# Patient Record
Sex: MALE | Race: WHITE | NOT HISPANIC OR LATINO | Employment: OTHER | URBAN - METROPOLITAN AREA
[De-identification: names, ages, dates, MRNs, and addresses within clinical notes are randomized per-mention and may not be internally consistent; named-entity substitution may affect disease eponyms.]

---

## 2023-10-19 ENCOUNTER — OUTSIDE FACILITY SERVICE (OUTPATIENT)
Dept: CARDIOLOGY | Facility: CLINIC | Age: 48
End: 2023-10-19
Payer: MEDICAID

## 2025-07-10 ENCOUNTER — LAB (OUTPATIENT)
Dept: LAB | Facility: HOSPITAL | Age: 50
End: 2025-07-10
Payer: MEDICAID

## 2025-07-10 ENCOUNTER — OFFICE VISIT (OUTPATIENT)
Dept: GASTROENTEROLOGY | Facility: CLINIC | Age: 50
End: 2025-07-10
Payer: MEDICAID

## 2025-07-10 VITALS
OXYGEN SATURATION: 99 % | WEIGHT: 260 LBS | HEART RATE: 77 BPM | DIASTOLIC BLOOD PRESSURE: 82 MMHG | BODY MASS INDEX: 34.46 KG/M2 | SYSTOLIC BLOOD PRESSURE: 126 MMHG | HEIGHT: 73 IN

## 2025-07-10 DIAGNOSIS — K74.60 CIRRHOSIS OF LIVER WITHOUT ASCITES, UNSPECIFIED HEPATIC CIRRHOSIS TYPE: Primary | Chronic | ICD-10-CM

## 2025-07-10 DIAGNOSIS — E66.09 CLASS 1 OBESITY DUE TO EXCESS CALORIES WITH SERIOUS COMORBIDITY AND BODY MASS INDEX (BMI) OF 34.0 TO 34.9 IN ADULT: Chronic | ICD-10-CM

## 2025-07-10 DIAGNOSIS — B19.10 HEPATITIS B INFECTION WITHOUT DELTA AGENT WITHOUT HEPATIC COMA, UNSPECIFIED CHRONICITY: ICD-10-CM

## 2025-07-10 DIAGNOSIS — E66.811 CLASS 1 OBESITY DUE TO EXCESS CALORIES WITH SERIOUS COMORBIDITY AND BODY MASS INDEX (BMI) OF 34.0 TO 34.9 IN ADULT: Chronic | ICD-10-CM

## 2025-07-10 DIAGNOSIS — Z86.0100 PERSONAL HISTORY OF COLON POLYPS, UNSPECIFIED: Chronic | ICD-10-CM

## 2025-07-10 DIAGNOSIS — R79.0 ABNORMAL SERUM IRON LEVEL: Chronic | ICD-10-CM

## 2025-07-10 DIAGNOSIS — K74.60 CIRRHOSIS OF LIVER WITHOUT ASCITES, UNSPECIFIED HEPATIC CIRRHOSIS TYPE: ICD-10-CM

## 2025-07-10 DIAGNOSIS — R05.9 COUGH, UNSPECIFIED TYPE: Chronic | ICD-10-CM

## 2025-07-10 DIAGNOSIS — B19.10 HEPATITIS B INFECTION WITHOUT DELTA AGENT WITHOUT HEPATIC COMA, UNSPECIFIED CHRONICITY: Chronic | ICD-10-CM

## 2025-07-10 DIAGNOSIS — D69.6 THROMBOCYTOPENIA: ICD-10-CM

## 2025-07-10 PROBLEM — M25.569 GONALGIA: Status: ACTIVE | Noted: 2025-07-10

## 2025-07-10 PROBLEM — R76.8 HEPATITIS B CORE ANTIBODY POSITIVE: Status: ACTIVE | Noted: 2025-07-10

## 2025-07-10 PROBLEM — R13.19 ESOPHAGEAL DYSPHAGIA: Chronic | Status: ACTIVE | Noted: 2025-07-10

## 2025-07-10 PROBLEM — E78.5 HLD (HYPERLIPIDEMIA): Status: ACTIVE | Noted: 2025-07-10

## 2025-07-10 PROBLEM — M06.9 ARTHRITIS OR POLYARTHRITIS, RHEUMATOID: Status: ACTIVE | Noted: 2025-07-10

## 2025-07-10 LAB
DEPRECATED RDW RBC AUTO: 48.3 FL (ref 37–54)
ERYTHROCYTE [DISTWIDTH] IN BLOOD BY AUTOMATED COUNT: 13 % (ref 12.3–15.4)
HCT VFR BLD AUTO: 47.1 % (ref 37.5–51)
HGB BLD-MCNC: 16.2 G/DL (ref 13–17.7)
INR PPP: 1.04 (ref 0.9–1.1)
MCH RBC QN AUTO: 34.7 PG (ref 26.6–33)
MCHC RBC AUTO-ENTMCNC: 34.4 G/DL (ref 31.5–35.7)
MCV RBC AUTO: 100.9 FL (ref 79–97)
PLATELET # BLD AUTO: 104 10*3/MM3 (ref 140–450)
PMV BLD AUTO: 11.9 FL (ref 6–12)
PROTHROMBIN TIME: 14.4 SECONDS (ref 12.3–15.1)
RBC # BLD AUTO: 4.67 10*6/MM3 (ref 4.14–5.8)
WBC NRBC COR # BLD AUTO: 7.39 10*3/MM3 (ref 3.4–10.8)

## 2025-07-10 PROCEDURE — 1159F MED LIST DOCD IN RCRD: CPT | Performed by: NURSE PRACTITIONER

## 2025-07-10 PROCEDURE — 83540 ASSAY OF IRON: CPT

## 2025-07-10 PROCEDURE — 84466 ASSAY OF TRANSFERRIN: CPT

## 2025-07-10 PROCEDURE — 87341 HEP B SURFACE AG NEUTRLZJ IA: CPT

## 2025-07-10 PROCEDURE — 1160F RVW MEDS BY RX/DR IN RCRD: CPT | Performed by: NURSE PRACTITIONER

## 2025-07-10 PROCEDURE — 86707 HEPATITIS BE ANTIBODY: CPT

## 2025-07-10 PROCEDURE — 87340 HEPATITIS B SURFACE AG IA: CPT

## 2025-07-10 PROCEDURE — 99204 OFFICE O/P NEW MOD 45 MIN: CPT | Performed by: NURSE PRACTITIONER

## 2025-07-10 PROCEDURE — 82728 ASSAY OF FERRITIN: CPT

## 2025-07-10 PROCEDURE — 86708 HEPATITIS A ANTIBODY: CPT

## 2025-07-10 PROCEDURE — 80053 COMPREHEN METABOLIC PANEL: CPT

## 2025-07-10 PROCEDURE — 85027 COMPLETE CBC AUTOMATED: CPT

## 2025-07-10 PROCEDURE — 87517 HEPATITIS B DNA QUANT: CPT

## 2025-07-10 PROCEDURE — 85610 PROTHROMBIN TIME: CPT

## 2025-07-10 PROCEDURE — 87350 HEPATITIS BE AG IA: CPT

## 2025-07-10 PROCEDURE — 36415 COLL VENOUS BLD VENIPUNCTURE: CPT

## 2025-07-10 RX ORDER — MONTELUKAST SODIUM 10 MG/1
TABLET ORAL
COMMUNITY
Start: 2025-06-09

## 2025-07-10 RX ORDER — SODIUM CHLORIDE 9 MG/ML
70 INJECTION, SOLUTION INTRAVENOUS CONTINUOUS PRN
OUTPATIENT
Start: 2025-07-10 | End: 2025-07-11

## 2025-07-10 RX ORDER — SPIRONOLACTONE 100 MG/1
100 TABLET, FILM COATED ORAL DAILY
COMMUNITY

## 2025-07-10 RX ORDER — FUROSEMIDE 40 MG/1
40 TABLET ORAL 2 TIMES DAILY
COMMUNITY

## 2025-07-10 RX ORDER — FLUTICASONE PROPIONATE AND SALMETEROL 50; 250 UG/1; UG/1
POWDER RESPIRATORY (INHALATION)
COMMUNITY
Start: 2025-06-09

## 2025-07-10 NOTE — PATIENT INSTRUCTIONS
Low sodium diet - avoid salt, salt substitutes, ketchup, soy sauce, pickles, crackers, chips, canned soups, etc.  Hepatitis B counseling.   Labs  Abdominal ultrasound  Colonoscopy for surveillance in July 2028.   Upper endoscopy-EGD: The indications, technique, alternatives and potential risk and complications were discussed with the patient including but not limited to bleeding, perforations, missing lesions and anesthetic complications. The patient understands and wishes to proceed with the procedure and has given their verbal consent. Written patient education information was given to the patient.   The patient will call if they have further questions before procedure.

## 2025-07-10 NOTE — PROGRESS NOTES
New Patient Consult      Date: 07/10/2025   Patient Name: Silvio Mcdonough  MRN: [id number]  : 1975     Primary Care Provider: Esperanza Fiore APRN    Chief Complaint   Patient presents with    Hepatitis     7/10/2025  History of Present Illness  The patient is a 50-year-old male who is here for evaluation of hepatitis B.    He was diagnosed with cirrhosis of the liver approximately 2 years ago, which was attributed to his hepatitis B infection. A CT scan conducted in  revealed signs of cirrhosis and portal hypertension.  He reports no history of intravenous drug use or alcohol consumption. He has tattoos and recalls possibly receiving a blood transfusion during a leg operation. He smokes cigarettes but does not use smokeless tobacco or vape. He reports no family history of liver disease. He is currently taking diuretics -Lasix 40 mg twice a day and spironolactone 100 mg once a day that was prescribed by Dr. Garcia. He is wanting to transfer to this practice as he felt he was not given any information regarding his diagnosis and was not receiving appropriate treatment. He underwent an endoscopy and colonoscopy by Dr. Garcia in .    He experiences mild pain on right side of his back but reports no abdominal pain, nausea, vomiting, constipation, diarrhea, or presence of bloody or black tarry stools. He does report coughing fits after eating, as if he is choking, but does not cough up food.  He does not have trouble swallowing food while eating.  He does not experience reflux, indigestion, or heartburn.    SOCIAL HISTORY  Alcohol: The patient reports no alcohol use.  Tobacco: The patient smokes cigarettes.  Recreational Drugs: The patient reports no history of i.v. drug use.    FAMILY HISTORY  - Mother: Breast cancer  - Negative for liver disease, colon cancer, stomach cancer, esophageal cancers     Subjective      Past Medical History:   Diagnosis Date    COPD (chronic obstructive  pulmonary disease)     Hepatitis B 2023     Past Surgical History:   Procedure Laterality Date    KNEE SURGERY Left 2006     Family History   Problem Relation Age of Onset    Breast cancer Mother     Liver disease Neg Hx     Colon cancer Neg Hx     Esophageal cancer Neg Hx     Stomach cancer Neg Hx      Social History     Socioeconomic History    Marital status:    Tobacco Use    Smoking status: Every Day     Types: Cigarettes    Smokeless tobacco: Never   Vaping Use    Vaping status: Never Used   Substance and Sexual Activity    Alcohol use: Never    Drug use: Never    Sexual activity: Defer       Current Outpatient Medications:     Advair Diskus 250-50 MCG/ACT DISKUS, INHALE ONE (1) PUFF BY MOUTH TWICE DAILY IN THE MORNING AND EVENING APPROXIMATELY 12 HOURS APART, Disp: , Rfl:     furosemide (LASIX) 40 MG tablet, Take 1 tablet by mouth 2 (Two) Times a Day., Disp: , Rfl:     montelukast (SINGULAIR) 10 MG tablet, take one (1) tablet by mouth each evening, Disp: , Rfl:     spironolactone (ALDACTONE) 100 MG tablet, Take 1 tablet by mouth Daily., Disp: , Rfl:      No Known Allergies    The following portions of the patient's history were reviewed and updated as appropriate: allergies, current medications, past family history, past medical history, past social history, past surgical history and problem list.    Objective     Physical Exam  Vitals and nursing note reviewed.   Constitutional:       General: He is not in acute distress.     Appearance: Normal appearance. He is well-developed.   HENT:      Head: Normocephalic and atraumatic.      Mouth/Throat:      Mouth: Mucous membranes are not pale, not dry and not cyanotic.   Eyes:      General: Lids are normal.   Neck:      Trachea: Trachea normal.   Cardiovascular:      Rate and Rhythm: Normal rate.   Pulmonary:      Effort: Pulmonary effort is normal. No respiratory distress.      Breath sounds: Normal breath sounds.   Abdominal:      General: Bowel sounds are  "normal.      Palpations: Abdomen is soft.      Tenderness: There is no abdominal tenderness.   Skin:     General: Skin is warm and dry.   Neurological:      Mental Status: He is alert and oriented to person, place, and time.   Psychiatric:         Mood and Affect: Mood normal.         Speech: Speech normal.         Behavior: Behavior normal. Behavior is cooperative.       Vitals:    07/10/25 1345   BP: 126/82   Pulse: 77   SpO2: 99%   Weight: 118 kg (260 lb)   Height: 185.4 cm (73\")     Body mass index is 34.3 kg/m².     Results Review:   I have reviewed the patient's new clinical and imaging results.    Lab on 07/10/2025   Component Date Value Ref Range Status    Protime 07/10/2025 14.4  12.3 - 15.1 Seconds Final    INR 07/10/2025 1.04  0.90 - 1.10 Final      Dated 10/11/2021 alkaline phosphatase 117 ALT 39 AST 44 total bilirubin 0.4 Hemoglobin 13.7, hematocrit 39, platelet count 91, PT/INR 10.8/1.0, Hepatitis B core IgM: Negative, hepatitis B core total antibody: Positive, hepatitis B surface antigen: Positive, hepatitis B surface antibody: Nonreactive, hepatitis C antibody: 0.1    Hepatitis Panel, Acute w/ HCV NAAT Rflx(SENDOUT) (07/23/2023 15:25)  HIV-1/2 Ab Diff, Supplemental(SENDOUT) (07/23/2023 15:25)  Iron and TIBC (07/23/2023 15:25)  FERRITIN (07/23/2023 15:25)  Alpha-1-Antitrypsin(SENDOUT) (07/23/2023 15:25)  CERULOPLASMIN (07/23/2023 15:25)  Mitochondrial M2 Ab, IgG(SENDOUT) (07/23/2023 15:25)  Smooth Muscle Ab, IgG Titer(SENDOUT) (07/23/2023 15:25)  Liver-Kidney Microsome Abs, IgG(SENDOUT) (07/23/2023 15:25)  Immunoglobulin G(SENDOUT) (07/23/2023 15:25)    Dated 2/13/2025 alkaline phosphatase 153 ALT 20 AST 29 total bilirubin 0.8, Hepatitis B core total antibody: Positive, hepatitis B surface antibody: Nonreactive, hepatitis C antibody: Nonreactive    CTAP with contrast 6/9/2023  Cirrhosis with evidence of portal hypertension.  No significant ascites is seen.  No evidence of bowel obstruction.  Mild " splenomegaly is present.  There is significant varices in the upper abdomen.    Esophagogastroduodenoscopy 7/26/2023 per Dr. Garcia  1. Duodenum-appears normal  2. Stomach-hemorrhagic gastritis of stomach antrum and body. Fundus shows portal hypertensive gastropathy. And a small sliding hiatal hernia best seen on retroflexion. No evidence of gastric varices. Multiple antrum and body biopsies were done and sent for histopathology. There was some retained scattered food in the stomach obscuring centimeters of the mucosa in the stomach body and fundus.  3. Esophagus-appears normal. No evidence of esophageal varices.  A.   ANTRUM, BIOPSY:  Reactive gastropathy  No Helicobacter pylori like organisms identified on H&E stained slide  No intestinal metaplasia or dysplasia identified     Colonoscopy 7/26/2023 per Dr. Garcia  1. Terminal ileum-appears normal  2. Cecum-appears normal  3. Ascending colon-normal  4. Transverse colon-appears normal  5. Descending colon-appears normal  6. Sigmoid colon-appears normal  7. Rectum-6 mm polyp in the mid rectum removed completely by snare cautery.  8. Anorectal area-grade 1 internal hemorrhoids. Digital examination canal appears normal. Perianal examination shows external hemorrhoids.  Limitations: As mentioned above regarding prep.   Repeat colonoscopy in 5 years.   B.   RECTAL POLYP:  Hyperplastic polyp    Assessment / Plan      1. Cirrhosis of liver without ascites, unspecified hepatic cirrhosis type  MELD 8 (10/2021)  2. Thrombocytopenia  3. Hepatitis B infection without delta agent without hepatic coma, unspecified chronicity  4. Abnormal serum iron level  5. Cough, unspecified type  6.  Class I obesity due to excess calories with serious comorbidity and body mass index of 34.0-34.9 in adult  BMI 34.30  Patient states he was diagnosed with hepatitis B approximately 2 years ago, 2023.  He denies history of IVDA or alcohol use.  He has tattoos.  He possibly had a blood  transfusion, but not sure. Labs dated 10/11/2021 with thrombocytopenia, PT/INR normal, Hepatitis B core IgM negative, hepatitis B core total antibody positive, hepatitis B surface antigen positive, hepatitis B surface antibody nonreactive, hepatitis C antibody nonreactive. Labs dated 7/23/2023  hepatitis B surface antigen positive, hepatitis B antibody IgM negative, hepatitis A IgM negative, hepatitis C antibody negative.  LATANYA positive.  Mitochondrial antibody normal.  Smooth muscle antibody normal.  Liver kidney microsomal antibody normal.  Alpha-1 antitrypsin normal.  Iron level elevated at 182 and iron saturation elevated at 92%.  Ferritin elevated at 563.1.  LATANYA positive.  HIV negative.  CTAP with contrast 6/9/2023 with cirrhosis with evidence of portal hypertension, no significant ascites, mild splenomegaly, significant varices in upper abdomen noted.  EGD dated 7/26/2023 per Dr. Garcia with portal hypertensive gastropathy, no evidence of gastric varices, no evidence of esophageal varices, biopsies unremarkable. Labs dated 2/13/2025 with liver enzymes normal including total bilirubin, hepatitis B core total antibody positive, hepatitis B surface antibody nonreactive, hepatitis C antibody nonreactive. MELD score 8 per labs 10/2021, no recent PT/INR.     Hepatitis B counseling.   Advised to follow low sodium diet.   Labs  Abdominal ultrasound  EGD for esophageal varices surveillance    - US Abdomen Complete; Future  - CBC (No Diff); Future  - Comprehensive Metabolic Panel; Future  - Protime-INR; Future  - Hepatitis A Antibody, Total; Future  - Hepatitis B Surface Antigen; Future  - Hepatitis B E Antibody; Future  - Hepatitis B E Antigen; Future  - Hepatitis B DNA, Quantitative, PCR; Future  - Iron Profile w/o Ferritin; Future  - Ferritin; Future  - Case Request    7. Personal history of colon polyps, unspecified  Colonoscopy dated 7/26/2023 per Dr. Garcia with 1 polyp removed, hyperplastic polyp.  He was  recommended follow-up colonoscopy in 5 years at that time.  There is no family history of colon cancer.  Colonoscopy for surveillance in 5 years, July 2028.    Patient Instructions   Low sodium diet - avoid salt, salt substitutes, ketchup, soy sauce, pickles, crackers, chips, canned soups, etc.  Hepatitis B counseling.   Labs  Abdominal ultrasound  Colonoscopy for surveillance in July 2028.   Upper endoscopy-EGD: The indications, technique, alternatives and potential risk and complications were discussed with the patient including but not limited to bleeding, perforations, missing lesions and anesthetic complications. The patient understands and wishes to proceed with the procedure and has given their verbal consent. Written patient education information was given to the patient.   The patient will call if they have further questions before procedure.     DAMIAN Yuan  7/10/2025    Please note that portions of this note may have been completed with a voice recognition program.     Patient or patient representative verbalized consent for the use of Ambient Listening during the visit with  DAMIAN Yuan for chart documentation. 7/10/2025  14:08 EDT

## 2025-07-11 LAB
ALBUMIN SERPL-MCNC: 3.7 G/DL (ref 3.5–5.2)
ALBUMIN/GLOB SERPL: 0.9 G/DL
ALP SERPL-CCNC: 109 U/L (ref 39–117)
ALT SERPL W P-5'-P-CCNC: 20 U/L (ref 1–41)
ANION GAP SERPL CALCULATED.3IONS-SCNC: 6 MMOL/L (ref 5–15)
AST SERPL-CCNC: 34 U/L (ref 1–40)
BILIRUB SERPL-MCNC: 1 MG/DL (ref 0–1.2)
BUN SERPL-MCNC: 12 MG/DL (ref 6–20)
BUN/CREAT SERPL: 11.8 (ref 7–25)
CALCIUM SPEC-SCNC: 9.4 MG/DL (ref 8.6–10.5)
CHLORIDE SERPL-SCNC: 103 MMOL/L (ref 98–107)
CO2 SERPL-SCNC: 26 MMOL/L (ref 22–29)
CREAT SERPL-MCNC: 1.02 MG/DL (ref 0.76–1.27)
EGFRCR SERPLBLD CKD-EPI 2021: 89 ML/MIN/1.73
FERRITIN SERPL-MCNC: 384 NG/ML (ref 30–400)
GLOBULIN UR ELPH-MCNC: 3.9 GM/DL
GLUCOSE SERPL-MCNC: 88 MG/DL (ref 65–99)
HBV SURFACE AG SERPL QL IA: ABNORMAL
IRON 24H UR-MRATE: 186 MCG/DL (ref 59–158)
IRON SATN MFR SERPL: 64 % (ref 20–50)
POTASSIUM SERPL-SCNC: 4.3 MMOL/L (ref 3.5–5.2)
PROT SERPL-MCNC: 7.6 G/DL (ref 6–8.5)
SODIUM SERPL-SCNC: 135 MMOL/L (ref 136–145)
TIBC SERPL-MCNC: 292 MCG/DL (ref 298–536)
TRANSFERRIN SERPL-MCNC: 196 MG/DL (ref 200–360)

## 2025-07-12 LAB
HAV AB SER QL IA: NEGATIVE
HBV DNA SERPL NAA+PROBE-ACNC: 830 IU/ML
HBV DNA SERPL NAA+PROBE-LOG IU: 2.92 LOG10 IU/ML
HBV E AB SERPL QL IA: REACTIVE
HBV E AG SERPL QL IA: NEGATIVE
HBV SURFACE AG SERPL QL IA: NORMAL
HBV SURFACE AG SERPL QL NT: POSITIVE
REF LAB TEST REF RANGE: NORMAL

## 2025-07-23 ENCOUNTER — RESULTS FOLLOW-UP (OUTPATIENT)
Dept: GASTROENTEROLOGY | Facility: CLINIC | Age: 50
End: 2025-07-23
Payer: MEDICAID

## 2025-07-23 DIAGNOSIS — B19.10 HEPATITIS B INFECTION WITHOUT DELTA AGENT WITHOUT HEPATIC COMA, UNSPECIFIED CHRONICITY: Primary | ICD-10-CM

## 2025-07-23 RX ORDER — ENTECAVIR 0.5 MG/1
0.5 TABLET, FILM COATED ORAL DAILY
Qty: 30 TABLET | Refills: 5 | Status: SHIPPED | OUTPATIENT
Start: 2025-07-23

## 2025-08-04 DIAGNOSIS — R79.0 ABNORMAL SERUM IRON LEVEL: Primary | ICD-10-CM

## 2025-08-04 DIAGNOSIS — K74.60 CIRRHOSIS OF LIVER WITHOUT ASCITES, UNSPECIFIED HEPATIC CIRRHOSIS TYPE: ICD-10-CM

## 2025-08-04 RX ORDER — CARVEDILOL 6.25 MG/1
6.25 TABLET ORAL 2 TIMES DAILY WITH MEALS
Qty: 60 TABLET | Refills: 3 | Status: SHIPPED | OUTPATIENT
Start: 2025-08-04

## 2025-08-06 ENCOUNTER — TELEPHONE (OUTPATIENT)
Dept: GASTROENTEROLOGY | Facility: CLINIC | Age: 50
End: 2025-08-06
Payer: MEDICAID

## 2025-08-06 ENCOUNTER — LAB (OUTPATIENT)
Dept: LAB | Facility: HOSPITAL | Age: 50
End: 2025-08-06
Payer: MEDICAID

## 2025-08-06 PROCEDURE — 81256 HFE GENE: CPT | Performed by: NURSE PRACTITIONER
